# Patient Record
Sex: FEMALE | Race: WHITE
[De-identification: names, ages, dates, MRNs, and addresses within clinical notes are randomized per-mention and may not be internally consistent; named-entity substitution may affect disease eponyms.]

---

## 2018-07-06 ENCOUNTER — HOSPITAL ENCOUNTER (EMERGENCY)
Dept: HOSPITAL 14 - H.ER | Age: 28
Discharge: HOME | End: 2018-07-06
Payer: COMMERCIAL

## 2018-07-06 VITALS
HEART RATE: 70 BPM | TEMPERATURE: 98 F | DIASTOLIC BLOOD PRESSURE: 61 MMHG | OXYGEN SATURATION: 99 % | SYSTOLIC BLOOD PRESSURE: 100 MMHG

## 2018-07-06 VITALS — RESPIRATION RATE: 16 BRPM

## 2018-07-06 DIAGNOSIS — E03.9: ICD-10-CM

## 2018-07-06 DIAGNOSIS — R10.31: Primary | ICD-10-CM

## 2018-07-06 DIAGNOSIS — R11.0: ICD-10-CM

## 2018-07-06 LAB
ALBUMIN SERPL-MCNC: 4.2 G/DL (ref 3.5–5)
ALBUMIN/GLOB SERPL: 1.4 {RATIO} (ref 1–2.1)
ALT SERPL-CCNC: 17 U/L (ref 9–52)
APTT BLD: 29.2 SECONDS (ref 25.6–37.1)
AST SERPL-CCNC: 23 U/L (ref 14–36)
BASOPHILS # BLD AUTO: 0 K/UL (ref 0–0.2)
BASOPHILS NFR BLD: 0.4 % (ref 0–2)
BUN SERPL-MCNC: 11 MG/DL (ref 7–17)
CALCIUM SERPL-MCNC: 9.3 MG/DL (ref 8.4–10.2)
EOSINOPHIL # BLD AUTO: 0.2 K/UL (ref 0–0.7)
EOSINOPHIL NFR BLD: 2.4 % (ref 0–4)
ERYTHROCYTE [DISTWIDTH] IN BLOOD BY AUTOMATED COUNT: 13.3 % (ref 11.5–14.5)
GFR NON-AFRICAN AMERICAN: > 60
HGB BLD-MCNC: 13.1 G/DL (ref 12–16)
INR PPP: 1 (ref 0.9–1.2)
LYMPHOCYTES # BLD AUTO: 2.3 K/UL (ref 1–4.3)
LYMPHOCYTES NFR BLD AUTO: 30.5 % (ref 20–40)
MCH RBC QN AUTO: 29.7 PG (ref 27–31)
MCHC RBC AUTO-ENTMCNC: 34.5 G/DL (ref 33–37)
MCV RBC AUTO: 86.1 FL (ref 81–99)
MONOCYTES # BLD: 0.6 K/UL (ref 0–0.8)
MONOCYTES NFR BLD: 8.1 % (ref 0–10)
NEUTROPHILS # BLD: 4.4 K/UL (ref 1.8–7)
NEUTROPHILS NFR BLD AUTO: 58.6 % (ref 50–75)
NRBC BLD AUTO-RTO: 0.1 % (ref 0–0)
PLATELET # BLD: 176 K/UL (ref 130–400)
PMV BLD AUTO: 9 FL (ref 7.2–11.7)
PROTHROMBIN TIME: 10.5 SECONDS (ref 9.8–13.1)
RBC # BLD AUTO: 4.42 MIL/UL (ref 3.8–5.2)
WBC # BLD AUTO: 7.6 K/UL (ref 4.8–10.8)

## 2018-07-06 PROCEDURE — 81025 URINE PREGNANCY TEST: CPT

## 2018-07-06 PROCEDURE — 80053 COMPREHEN METABOLIC PANEL: CPT

## 2018-07-06 PROCEDURE — 85610 PROTHROMBIN TIME: CPT

## 2018-07-06 PROCEDURE — 76857 US EXAM PELVIC LIMITED: CPT

## 2018-07-06 PROCEDURE — 74177 CT ABD & PELVIS W/CONTRAST: CPT

## 2018-07-06 PROCEDURE — 99283 EMERGENCY DEPT VISIT LOW MDM: CPT

## 2018-07-06 PROCEDURE — 85730 THROMBOPLASTIN TIME PARTIAL: CPT

## 2018-07-06 PROCEDURE — 85025 COMPLETE CBC W/AUTO DIFF WBC: CPT

## 2018-07-06 NOTE — ED PDOC
HPI: Abdomen


Chief Complaint (Provider): abdominal pain


History Per: Patient, Family


History/Exam Limitations: no limitations


Onset/Duration Of Symptoms: Hrs


Current Symptoms Are (Timing): Still Present


Location Of Pain/Discomfort: RLQ


Quality Of Discomfort: "Pain"





<Catie Bonilla - Last Filed: 07/06/18 05:50>





<Teena Winters A - Last Filed: 07/07/18 03:37>


Time Seen by Provider: 07/06/18 02:37


Chief Complaint (Nursing): Abdominal Pain


Additional Complaint(s): 





27 y/o female presents for evaluation of right lower abdominal pain x 2 hours.  

Associated nausea.  DEnies fever, vomiting, chest pain, shortness of breath, 

palpitations, changes in bowel movements, urinary symptoms.  No improvement 

with tums. (Catie Bonilla)





Past Medical History


Reviewed: Historical Data, Nursing Documentation, Vital Signs





- Medical History


PMH: Hypothyroidism





- Family History


Family History: States: No Known Family Hx





- Living Arrangements


Living Arrangements: With Family





<Catie Bonilla - Last Filed: 07/06/18 05:50>





<Teena Winters - Last Filed: 07/07/18 03:37>


Vital Signs: 





 Last Vital Signs











Temp  98.0 F   07/06/18 11:16


 


Pulse  70   07/06/18 11:16


 


Resp  16   07/06/18 11:16


 


BP  100/61   07/06/18 11:16


 


Pulse Ox  99   07/06/18 11:16














- Allergies


Allergies/Adverse Reactions: 


 Allergies











Allergy/AdvReac Type Severity Reaction Status Date / Time


 


No Known Allergies Allergy   Verified 07/06/18 02:29














Review of Systems


ROS Statement: Except As Marked, All Systems Reviewed And Found Negative


Gastrointestinal: Positive for: Abdominal Pain





<Catie Bonilla - Last Filed: 07/06/18 05:50>





Physical Exam





- Reviewed


Nursing Documentation Reviewed: Yes


Vital Signs Reviewed: Yes





- Physical Exam


Appears: Positive for: Well, Non-toxic, Uncomfortable


Head Exam: Positive for: ATRAUMATIC, NORMAL INSPECTION, NORMOCEPHALIC


Skin: Positive for: Normal Color


Eye Exam: Positive for: Normal appearance


ENT: Positive for: Normal ENT Inspection


Cardiovascular/Chest: Positive for: Regular Rate, Rhythm


Respiratory: Positive for: Normal Breath Sounds


Gastrointestinal/Abdominal: Positive for: Bowel Sounds, Soft, Tenderness (RLQ).

  Negative for: Guarding, Rebound


Back: Positive for: Normal Inspection


Extremity: Positive for: Normal ROM


Neurologic/Psych: Positive for: Alert, Oriented





<Catie Bonilla - Last Filed: 07/06/18 05:50>





- Laboratory Results


Result Diagrams: 


 07/06/18 03:16





 07/06/18 03:16





- ECG


O2 Sat by Pulse Oximetry: 100





<Catie Bonilla - Last Filed: 07/06/18 05:50>





- Laboratory Results


Result Diagrams: 


 07/06/18 03:16





 07/06/18 03:16





<Teena Winters - Last Filed: 07/07/18 03:37>





Disposition





- Disposition


Disposition Time: 06:00


Patient Signed Over To: Teena Winters


Handoff Comments: pending CT





<Catie Bonilla - Last Filed: 07/06/18 05:50>





- Patient ED Disposition


Is Patient to be Admitted: Transfer of Care


Counseled Patient/Family Regarding: Studies Performed, Diagnosis





- Disposition


Disposition: Transfer of Care


Disposition Time: 07:00


Patient Signed Over To: Florence Subramanian





<Teena Winters - Last Filed: 07/07/18 03:37>





- Clinical Impression


Clinical Impression: 


 Abdominal pain








- Disposition


Referrals: 


[facility] [place] [Outside]


Condition: STABLE


Instructions:  Acute Abdomen (Belly Pain), Adult (DC)


Forms:  [facility] (English)

## 2018-07-06 NOTE — US
HISTORY:

RLQ pain with normal CT scan



COMPARISON:

None available.



TECHNIQUE:

Transabdominal and transvaginal pelvic ultrasound was performed with 

longitudinal and transverse images submitted for interpretation.



FINDINGS:



UTERUS:

Measures 7.6 x 3.6 x 5.4 cm. Normal in size and appearance. No 

fibroid or other mass lesion seen.



ENDOMETRIUM:

Measures 9.4 mm in diameter. Unremarkable. 



CERVIX:

No cervical abnormality identified.



RIGHT OVARY:

Measures 2.9 x 1.7 x 3.4 cm. No solid mass. Normal flow. 



LEFT OVARY:

Measures 3.1 x 1.8 x 3.2 cm. No solid mass. Normal flow. 



FREE FLUID:

No significant free fluid noted.



OTHER FINDINGS:

None. 



IMPRESSION:

Unremarkable pelvic ultrasound.

## 2018-07-06 NOTE — CT
PROCEDURE:  CT Abdomen and Pelvis with contrast



HISTORY:

right lower abdominal pain



COMPARISON:

None.



TECHNIQUE:

Contrast dose: 90 mL Omnipaque 300



Radiation dose:



Total exam DLP = 212.7 mGy-cm.



This CT exam was performed using one or more of the following dose 

reduction techniques: Automated exposure control, adjustment of the 

mA and/or kV according to patient size, and/or use of iterative 

reconstruction technique.



FINDINGS:



LOWER THORAX:

Unremarkable. 



LIVER:

Unremarkable. No gross lesion or ductal dilatation. 



GALLBLADDER AND BILE DUCTS:

Unremarkable. 



PANCREAS:

Unremarkable. No gross lesion or ductal dilatation.



SPLEEN:

Unremarkable. 



ADRENALS:

Unremarkable. No mass. 



KIDNEYS AND URETERS:

Unremarkable. No hydronephrosis. No solid mass. 



VASCULATURE:

Unremarkable. No aortic aneurysm. 



BOWEL:

Unremarkable. No obstruction. No gross mural thickening. 



APPENDIX:

Normal appendix. 



PERITONEUM:

Unremarkable. No free fluid. No free air. 



LYMPH NODES:

Unremarkable. No enlarged lymph nodes. 



BLADDER:

Unremarkable. 



REPRODUCTIVE:

Involuting left corpus luteal follicle. 



BONES:

No acute fracture. 



OTHER FINDINGS:

None.



IMPRESSION:

No acute abdominal pelvic pathology.

## 2018-07-06 NOTE — ED PDOC
- Laboratory Results


Result Diagrams: 


 07/06/18 03:16





 07/06/18 03:16





- ECG


O2 Sat by Pulse Oximetry: 100





Medical Decision Making


Medical Decision Making: 





received patient endorsement from Dr. Winters. Patient had CT at 5:30am with 

normal reading by [facility]. He signed out to have US pelvis if CT is normal. US 

ordered





10.30a - patient is completely without pain. CT and US normal. labs unrevealing





Disposition


Doctor Will See Patient In The: Office


Counseled Patient/Family Regarding: Diagnosis, Need For Followup





- Clinical Impression


Clinical Impression: 


 Abdominal pain








- POA


Present On Arrival: None





- Disposition


Referrals: 


[facility] [place] [Outside]


Disposition: Routine/Home


Disposition Time: 10:30


Condition: STABLE


Instructions:  Acute Abdomen (Belly Pain), Adult (DC)


Forms:  [facility] (English)